# Patient Record
Sex: MALE | Race: WHITE | NOT HISPANIC OR LATINO | Employment: OTHER | URBAN - METROPOLITAN AREA
[De-identification: names, ages, dates, MRNs, and addresses within clinical notes are randomized per-mention and may not be internally consistent; named-entity substitution may affect disease eponyms.]

---

## 2021-11-15 PROBLEM — S02.19XB NASO-ORBITO-ETHMOID FRACTURE, OPEN, INITIAL ENCOUNTER: Status: ACTIVE | Noted: 2021-11-15

## 2021-11-15 PROBLEM — S02.2XXA CLOSED FRACTURE OF NASAL SEPTUM: Status: ACTIVE | Noted: 2021-11-15

## 2021-11-15 PROBLEM — S02.2XXB NASO-ORBITO-ETHMOID FRACTURE, OPEN, INITIAL ENCOUNTER: Status: ACTIVE | Noted: 2021-11-15

## 2021-11-15 PROBLEM — S02.85XB NASO-ORBITO-ETHMOID FRACTURE, OPEN, INITIAL ENCOUNTER: Status: ACTIVE | Noted: 2021-11-15

## 2024-03-02 ENCOUNTER — HOSPITAL ENCOUNTER (EMERGENCY)
Facility: OTHER | Age: 54
Discharge: HOME | End: 2024-03-02
Attending: EMERGENCY MEDICINE
Payer: MEDICARE

## 2024-03-02 VITALS
OXYGEN SATURATION: 99 % | WEIGHT: 170 LBS | RESPIRATION RATE: 18 BRPM | HEIGHT: 68 IN | DIASTOLIC BLOOD PRESSURE: 73 MMHG | TEMPERATURE: 98 F | BODY MASS INDEX: 25.76 KG/M2 | HEART RATE: 82 BPM | SYSTOLIC BLOOD PRESSURE: 118 MMHG

## 2024-03-02 DIAGNOSIS — J06.9 VIRAL URI: Primary | ICD-10-CM

## 2024-03-02 DIAGNOSIS — R06.02 SOB (SHORTNESS OF BREATH): ICD-10-CM

## 2024-03-02 LAB
ALBUMIN SERPL BCP-MCNC: 4.1 G/DL (ref 3.5–5.2)
ALP SERPL-CCNC: 66 U/L (ref 55–135)
ALT SERPL W/O P-5'-P-CCNC: 24 U/L (ref 10–44)
ANION GAP SERPL CALC-SCNC: 9 MMOL/L (ref 8–16)
AST SERPL-CCNC: 21 U/L (ref 10–40)
BASOPHILS # BLD AUTO: 0.1 K/UL (ref 0–0.2)
BASOPHILS NFR BLD: 1.3 % (ref 0–1.9)
BILIRUB SERPL-MCNC: 0.3 MG/DL (ref 0.1–1)
BUN SERPL-MCNC: 15 MG/DL (ref 6–20)
CALCIUM SERPL-MCNC: 9.5 MG/DL (ref 8.7–10.5)
CHLORIDE SERPL-SCNC: 107 MMOL/L (ref 95–110)
CO2 SERPL-SCNC: 21 MMOL/L (ref 23–29)
CREAT SERPL-MCNC: 1.1 MG/DL (ref 0.5–1.4)
CTP QC/QA: YES
CTP QC/QA: YES
DIFFERENTIAL METHOD BLD: ABNORMAL
EOSINOPHIL # BLD AUTO: 0.6 K/UL (ref 0–0.5)
EOSINOPHIL NFR BLD: 7.2 % (ref 0–8)
ERYTHROCYTE [DISTWIDTH] IN BLOOD BY AUTOMATED COUNT: 14.4 % (ref 11.5–14.5)
EST. GFR  (NO RACE VARIABLE): >60 ML/MIN/1.73 M^2
GLUCOSE SERPL-MCNC: 104 MG/DL (ref 70–110)
HCT VFR BLD AUTO: 43.4 % (ref 40–54)
HGB BLD-MCNC: 14.4 G/DL (ref 14–18)
IMM GRANULOCYTES # BLD AUTO: 0.02 K/UL (ref 0–0.04)
IMM GRANULOCYTES NFR BLD AUTO: 0.3 % (ref 0–0.5)
LYMPHOCYTES # BLD AUTO: 1.3 K/UL (ref 1–4.8)
LYMPHOCYTES NFR BLD: 16.9 % (ref 18–48)
MCH RBC QN AUTO: 28.5 PG (ref 27–31)
MCHC RBC AUTO-ENTMCNC: 33.2 G/DL (ref 32–36)
MCV RBC AUTO: 86 FL (ref 82–98)
MONOCYTES # BLD AUTO: 1.2 K/UL (ref 0.3–1)
MONOCYTES NFR BLD: 15.7 % (ref 4–15)
NEUTROPHILS # BLD AUTO: 4.6 K/UL (ref 1.8–7.7)
NEUTROPHILS NFR BLD: 58.6 % (ref 38–73)
NRBC BLD-RTO: 0 /100 WBC
OHS QRS DURATION: 88 MS
OHS QTC CALCULATION: 421 MS
PLATELET # BLD AUTO: 202 K/UL (ref 150–450)
PMV BLD AUTO: 11 FL (ref 9.2–12.9)
POC MOLECULAR INFLUENZA A AGN: NEGATIVE
POC MOLECULAR INFLUENZA B AGN: NEGATIVE
POTASSIUM SERPL-SCNC: 4.4 MMOL/L (ref 3.5–5.1)
PROT SERPL-MCNC: 7 G/DL (ref 6–8.4)
RBC # BLD AUTO: 5.06 M/UL (ref 4.6–6.2)
SARS-COV-2 RDRP RESP QL NAA+PROBE: NEGATIVE
SODIUM SERPL-SCNC: 137 MMOL/L (ref 136–145)
TROPONIN I SERPL DL<=0.01 NG/ML-MCNC: <0.006 NG/ML (ref 0–0.03)
WBC # BLD AUTO: 7.79 K/UL (ref 3.9–12.7)

## 2024-03-02 PROCEDURE — 93005 ELECTROCARDIOGRAM TRACING: CPT

## 2024-03-02 PROCEDURE — 99285 EMERGENCY DEPT VISIT HI MDM: CPT | Mod: 25

## 2024-03-02 PROCEDURE — 25000003 PHARM REV CODE 250: Performed by: EMERGENCY MEDICINE

## 2024-03-02 PROCEDURE — 85025 COMPLETE CBC W/AUTO DIFF WBC: CPT | Performed by: EMERGENCY MEDICINE

## 2024-03-02 PROCEDURE — 84484 ASSAY OF TROPONIN QUANT: CPT | Performed by: EMERGENCY MEDICINE

## 2024-03-02 PROCEDURE — 93010 ELECTROCARDIOGRAM REPORT: CPT | Mod: ,,, | Performed by: INTERNAL MEDICINE

## 2024-03-02 PROCEDURE — 80053 COMPREHEN METABOLIC PANEL: CPT | Performed by: EMERGENCY MEDICINE

## 2024-03-02 PROCEDURE — 87635 SARS-COV-2 COVID-19 AMP PRB: CPT | Performed by: EMERGENCY MEDICINE

## 2024-03-02 RX ORDER — MIRTAZAPINE 45 MG/1
45 TABLET, FILM COATED ORAL NIGHTLY
Qty: 3 TABLET | Refills: 0 | Status: SHIPPED | OUTPATIENT
Start: 2024-03-02 | End: 2025-03-02

## 2024-03-02 RX ORDER — QUETIAPINE FUMARATE 400 MG/1
800 TABLET, FILM COATED ORAL NIGHTLY
Qty: 6 TABLET | Refills: 0 | Status: SHIPPED | OUTPATIENT
Start: 2024-03-02 | End: 2025-03-02

## 2024-03-02 RX ORDER — CYCLOBENZAPRINE HCL 10 MG
10 TABLET ORAL 3 TIMES DAILY PRN
Qty: 9 TABLET | Refills: 0 | Status: SHIPPED | OUTPATIENT
Start: 2024-03-02 | End: 2024-03-07

## 2024-03-02 RX ORDER — BUSPIRONE HYDROCHLORIDE 10 MG/1
10 TABLET ORAL 3 TIMES DAILY
Qty: 9 TABLET | Refills: 0 | Status: SHIPPED | OUTPATIENT
Start: 2024-03-02 | End: 2025-03-02

## 2024-03-02 RX ORDER — MIRTAZAPINE 30 MG/1
45 TABLET, FILM COATED ORAL
COMMUNITY

## 2024-03-02 RX ORDER — IBUPROFEN 400 MG/1
800 TABLET ORAL
Status: COMPLETED | OUTPATIENT
Start: 2024-03-02 | End: 2024-03-02

## 2024-03-02 RX ORDER — BUSPIRONE HYDROCHLORIDE 10 MG/1
10 TABLET ORAL 3 TIMES DAILY
COMMUNITY

## 2024-03-02 RX ORDER — CYCLOBENZAPRINE HCL 10 MG
10 TABLET ORAL EVERY 8 HOURS PRN
COMMUNITY
Start: 2023-12-09

## 2024-03-02 RX ORDER — TRAZODONE HYDROCHLORIDE 300 MG/1
300 TABLET ORAL NIGHTLY
Qty: 3 TABLET | Refills: 0 | Status: SHIPPED | OUTPATIENT
Start: 2024-03-02 | End: 2025-03-02

## 2024-03-02 RX ADMIN — IBUPROFEN 800 MG: 400 TABLET ORAL at 08:03

## 2024-03-02 NOTE — ED PROVIDER NOTES
"Encounter Date: 3/2/2024    SCRIBE #1 NOTE: I, Deb Reis, am scribing for, and in the presence of,  Nancy Jenkins MD. I have scribed the following portions of the note - Other sections scribed: HPI, ROS, Physical exams.       History     Chief Complaint   Patient presents with    Multiple Complaints     Pt sent from [facility] for a sore throat, medication refill, and not sleeping.     Time seen by physician: 7:37 AM    This is a 53 y.o. male who presents with complaint of throat pain starting 3 days ago. Patient has PMHx of Alcohol dependence, CAD, Chronic back pain,CVA ,GERD, Hypothyroidism, Parkinson disease, and RLS. He notes associated chest pain, rhinorrhea, cough, congestion, SOB, and anxiety all with similar onset. Patient reports he has been not compliant with medications for 6 days due to difficulty with pharmacy and has resided at the [facility] for the past 2 days. He reports he also has "not eaten or slept" since arriving at the [facility]. He reports he recently quit smoking 4 months ago.      Patient denies any other complaints at this time.     The history is provided by the patient.     Review of patient's allergies indicates:  No Known Allergies  Past Medical History:   Diagnosis Date    Alcohol dependence     CAD (coronary artery disease)     Chronic back pain     CVA (cerebral vascular accident)     GERD (gastroesophageal reflux disease)     Hypothyroidism     Parkinson disease     RLS (restless legs syndrome)      Past Surgical History:   Procedure Laterality Date    EYE SURGERY      HERNIA REPAIR      ORIF FACIAL FRACTURE Left 11/15/2021    Procedure: ORIF, FRACTURE, NASAL BONE ORIF Nasal-Orbtial-Ethmoid Complex ORIF orbital floor;  Surgeon: Alfredo Baeza MD, DMD;  Location: [facility];  Service: Oral Surgery;  Laterality: Left;     No family history on file.  Social History     Tobacco Use    Smoking status: Every Day     Current packs/day: 0.25     Types: Cigarettes, Cigars "    Smokeless tobacco: Never   Substance Use Topics    Alcohol use: Yes     Comment: occ    Drug use: Not Currently     Review of Systems   Constitutional:  Positive for appetite change. Negative for chills and fever.   HENT:  Positive for congestion, rhinorrhea and sore throat.    Respiratory:  Positive for cough and shortness of breath. Negative for chest tightness.    Cardiovascular:  Positive for chest pain. Negative for palpitations.   Gastrointestinal:  Negative for abdominal pain, diarrhea, nausea and vomiting.   Genitourinary:  Negative for dysuria and flank pain.   Musculoskeletal:  Negative for back pain and neck pain.   Skin:  Negative for color change and wound.   Neurological:  Negative for dizziness and headaches.   Psychiatric/Behavioral:  Positive for sleep disturbance. The patient is nervous/anxious.        Physical Exam     Initial Vitals [03/02/24 0727]   BP Pulse Resp Temp SpO2   132/84 88 18 98.2 °F (36.8 °C) 99 %      MAP       --         Physical Exam    Nursing note and vitals reviewed.  Constitutional: He appears well-developed and well-nourished.   HENT:   Head: Normocephalic and atraumatic.   Eyes: Conjunctivae are normal.   Neck: Neck supple.   Normal range of motion.  Cardiovascular:  Normal rate, regular rhythm and normal heart sounds.           Pulmonary/Chest: Breath sounds normal. No respiratory distress. He has no wheezes. He has no rales.   Abdominal: Abdomen is soft. Bowel sounds are normal. He exhibits no distension. There is no abdominal tenderness. There is no rebound.   Musculoskeletal:         General: No tenderness or edema. Normal range of motion.      Cervical back: Normal range of motion and neck supple.     Neurological: He is alert and oriented to person, place, and time.   Ambulatory with steady gait   Skin: Skin is warm and dry. Capillary refill takes less than 2 seconds.         ED Course   Procedures  Labs Reviewed   COMPREHENSIVE METABOLIC PANEL - Abnormal; Notable  for the following components:       Result Value    CO2 21 (*)     All other components within normal limits   CBC W/ AUTO DIFFERENTIAL - Abnormal; Notable for the following components:    Mono # 1.2 (*)     Eos # 0.6 (*)     Lymph % 16.9 (*)     Mono % 15.7 (*)     All other components within normal limits   TROPONIN I   SARS-COV-2 RDRP GENE   POCT INFLUENZA A/B MOLECULAR     EKG Readings: (Independently Interpreted)   7:45AM:  Rate of 78.  Normal sinus rhythm.  Normal axis.  Normal intervals.  No ST or ischemic changes.         Imaging Results              X-Ray Chest AP Portable (Final result)  Result time 03/02/24 07:58:11      Final result by Yao Adams MD (03/02/24 07:58:11)                   Impression:      As above.      Electronically signed by: Yao Adams MD  Date:    03/02/2024  Time:    07:58               Narrative:    EXAMINATION:  XR CHEST AP PORTABLE    CLINICAL HISTORY:  Shortness of breath    TECHNIQUE:  Single frontal view of the chest was performed.    COMPARISON:  11/04/2021    FINDINGS:  No consolidation, pleural effusion or pneumothorax.  Cardiomediastinal silhouette is unremarkable.                                    X-Rays:   Independently Interpreted Readings:   Chest X-Ray: Trachea midline.  No cardiomegaly.  No effusion, infiltrate, edema.     Medications   ibuprofen tablet 800 mg (800 mg Oral Given 3/2/24 0806)     Medical Decision Making  7:37AM:  Patient is a 53-year-old male who presents to the emergency department with chest pain, shortness breath, cough, congestion, sore throat.  Patient appears well, nontoxic.  He is breathing comfortably with no respiratory distress.  He has been having difficulty obtaining his medications while in rehab and therefore has had difficulty sleeping.  Patient likely has a viral illness.  However, will plan for labs, cardiac eval, EKG, will continue to follow and reassess.    Amount and/or Complexity of Data Reviewed  External Data Reviewed:  notes.  Labs: ordered. Decision-making details documented in ED Course.  Radiology: ordered and independent interpretation performed. Decision-making details documented in ED Course.  ECG/medicine tests: ordered and independent interpretation performed. Decision-making details documented in ED Course.    Risk  Prescription drug management.    8:47 AM:  Patient doing well, remains stable.  He is breathing comfortably.  He is feeling well.  His COVID and flu were both negative.  His chest x-ray, EKG, and cardiac eval are negative as well with no acute findings.  I have a low suspicion that his chest pain is cardiac in nature.  Patient likely has a viral upper respiratory infection.  I do not feel that further work up in the ED is indicated at this time.  I updated pt regarding results and I counseled pt regarding supportive care measures.  I have discussed with the pt ED return warnings and need for close PCP f/u.  Pt agreeable to plan and all questions answered.  I feel that pt is stable for discharge and management as an outpatient and no further intervention is needed at this time.  Pt is comfortable returning to the ED if needed.  Will DC home in stable condition.          Scribe Attestation:   Scribe #1: I performed the above scribed service and the documentation accurately describes the services I performed. I attest to the accuracy of the note.              Physician Attestation for Scribe: I, Nancy Jenkins, reviewed documentation as scribed in my presence, which is both accurate and complete.                   Clinical Impression:  Final diagnoses:  [R06.02] SOB (shortness of breath)  [J06.9] Viral URI (Primary)          ED Disposition Condition    Discharge Stable          ED Prescriptions    None       Follow-up Information       Follow up With Specialties Details Why Contact Aleshia Spann MD Family Medicine   [address] [place] [address]  [phone]                Nancy Jenkins MD  03/02/24 0894

## 2024-03-02 NOTE — ED TRIAGE NOTES
"Pt presented to the ED complaining of a runny nose, congestion, chest pain, Shortness of breath, and burning throat starting about 3 days ago. Not being able to sleep. States that he has not been able to take his medicine, and his mood is all over the place. States "my emotions are just everywhere". AAOx4  "